# Patient Record
Sex: FEMALE | URBAN - METROPOLITAN AREA
[De-identification: names, ages, dates, MRNs, and addresses within clinical notes are randomized per-mention and may not be internally consistent; named-entity substitution may affect disease eponyms.]

---

## 2022-02-15 ENCOUNTER — ATHLETIC TRAINING (OUTPATIENT)
Dept: SPORTS MEDICINE | Facility: OTHER | Age: 22
End: 2022-02-15

## 2022-02-15 DIAGNOSIS — S63.642A SPRAIN OF METACARPOPHALANGEAL (MCP) JOINT OF LEFT THUMB, INITIAL ENCOUNTER: Primary | ICD-10-CM

## 2022-02-15 NOTE — PROGRESS NOTES
AT Treatment  Subjective: Pt lost her thumb splint that she had in her glove for many years  She came to [facility] to try and make a replacement  Objective: See treatment diary below      Assessment: Tolerated treatment     Plan: Made thumb splint made of orthoplast that was able to fit in her glove  She will return if she needs adjustments

## 2022-04-06 ENCOUNTER — ATHLETIC TRAINING (OUTPATIENT)
Dept: SPORTS MEDICINE | Facility: OTHER | Age: 22
End: 2022-04-06

## 2022-04-06 DIAGNOSIS — M62.838 TRAPEZIUS MUSCLE SPASM: Primary | ICD-10-CM

## 2022-04-06 NOTE — PROGRESS NOTES
Athletic Training Shoulder Evaluation    Name: Marla Paz  Age: 22 y o    School District: [employer]   Sport: Softball  Date of Assessment: 4/6/2022    Assessment/Plan:     Visit Diagnosis: Trapezius muscle spasm [M62 838]    Treatment Plan:    []  Follow-up PRN  [x]  Follow-up prior to next practice/game for re-evaluation  [x]  Daily treatment/rehab  Progress note expected weekly  Referral:     []  Not needed at this time  []  Referred to:     [x]  Coaching staff notified  []  Parent/Guardian Notified    Subjective:    Date of Injury: 4/6/22    Injury occurred during:     []  Practice  [x]  Competition  []  Other:     Mechanism: Throwing to second base from catchers position    Previous History: None    Reported Symptoms:     [] Felt/heard a pop [] Pressure   [] Pain with rest [] Locking   [] Pain with activity [] Burning   [] Pain with overhead activity [] Weakness   [] Paresthesia [] Loss of motion   [] Sharp pain [] Crepitus   [x] Dull pain [] Clicking   [] Radiating pain [] Popping sensation   [] Felt give way [] Snapping sensation   [] [address] [] Cervical pain     Objective:    Observation:     [x]  No observable findings compared bilaterally    [] Swelling [] Asymmetry (in motion)   [] Ecchymosis [] Winged scapula   [] Deformity [] Scapular dyskinesis   [] Atrophy [] Uneven shoulders   [] Muscle spasm [] Spine curvature     Palpation: TTP over upper trapezius    Active Range of Motion:      Full  ROM Limited  ROM Pain  with  ROM No  Motion   Shoulder Flexion [x] [] [] []   Shoulder Extension [x] [] [] []   Shoulder Abduction [x] [] [] []   Shoulder Adduction [x] [] [] []   Horizontal Abduction [x] [] [] []   Horizontal Adduction [x] [] [] []   Internal Rotation  [x] [] [] []   Internal Rotation  [x] [] [] []   Scapular Retraction  [x] [] [] []   Scapular Protraction [x] [] [] []   Shoulder Elevation caused irritation    Manual Muscle Tests:     Not performed [x]             5 4+ 4 4- 3 or  Under   Shoulder Flexion [] [] [] [] []   Shoulder Extension [] [] [] [] []   Shoulder Abduction [] [] [] [] []   Shoulder Adduction [] [] [] [] []   Horizontal Abduction [] [] [] [] []   Horizontal Adduction [] [] [] [] []   Internal Rotation  [] [] [] [] []   Internal Rotation  [] [] [] [] []     Special Tests:      (+)  POS (-)  NEG Not  Tested   Anterior Apprehension [] [x] []   Relocation [] [x] []   Posterior Apprehension [] [x] []   Anterior Load & Shift [] [x] []   AC Compression [] [x] []   Sulcus Sign [] [x] []   Clunk [] [x] []   Crank [] [x] []   Drop Arm [] [x] []   Empty Can [] [x] []   Brett's [] [x] []   Speed's [] [x] []   Ayah's [] [x] []   Catarino's [] [x] []   [place]'s [] [x] []   Anjana's [] [x] []   Rubens's [] [x] []     Treatment Log:     Date: 4/6/22   Playing Status: Full Go       Exercise/Treatment    Electricl Stimulation  15 mins    Heat

## 2022-04-07 ENCOUNTER — ATHLETIC TRAINING (OUTPATIENT)
Dept: SPORTS MEDICINE | Facility: OTHER | Age: 22
End: 2022-04-07

## 2022-04-07 DIAGNOSIS — M62.838 TRAPEZIUS MUSCLE SPASM: Primary | ICD-10-CM

## 2022-04-07 NOTE — PROGRESS NOTES
Athletic Training Progress Note    Name: Mary Jo Tipton  Age: 22 y o  Assessment/Plan:     Visit Diagnosis: Trapezius muscle spasm [M62 838]    Treatment Plan:     []  Follow-up PRN  []  Follow-up prior to next practice/game for re-evaluation  [x]  Daily treatment/rehab  Progress note expected weekly  Subjective: States they feel slightly better compared to yesterday  Was interested to see if ice would be beneficial to her      Objective:   No objective measures    Treatment Log:     Date: 4/7       Playing Status: Full Go               Exercise/Treatment        Occipital Release 10 mins       Heat 10 mins       Electrical Stimulation 15 mins                                                                         Date: 4/6/22   Playing Status: Full Go       Exercise/Treatment    Electricl Stimulation  15 mins    Heat

## 2022-09-27 ENCOUNTER — ATHLETIC TRAINING (OUTPATIENT)
Dept: SPORTS MEDICINE | Facility: OTHER | Age: 22
End: 2022-09-27

## 2022-09-27 DIAGNOSIS — M79.651 PAIN IN BOTH THIGHS: ICD-10-CM

## 2022-09-27 DIAGNOSIS — M79.652 PAIN IN BOTH THIGHS: ICD-10-CM

## 2022-09-27 DIAGNOSIS — M62.89 QUADRICEP TIGHTNESS: Primary | ICD-10-CM

## 2022-09-27 NOTE — PROGRESS NOTES
Ath came into [facility] on 9/27/22 c/o quad tightness and pain while running, getting worse during sprints  She said that on practice Friday, she felt tightness and pain in thighs and had to stop running  Does not feel weak, described feeling as "very sore"      Treatment 9/27/22      Quad Stretches 2x30s      Roll out 3 mins      EStim 12 mins      Normatec 15 mins        Will cont to come in for pain mgmt